# Patient Record
Sex: FEMALE | Race: WHITE | Employment: UNEMPLOYED | URBAN - METROPOLITAN AREA
[De-identification: names, ages, dates, MRNs, and addresses within clinical notes are randomized per-mention and may not be internally consistent; named-entity substitution may affect disease eponyms.]

---

## 2025-06-10 ENCOUNTER — HOSPITAL ENCOUNTER (OUTPATIENT)
Dept: RADIOLOGY | Facility: EXTERNAL LOCATION | Age: 65
Discharge: HOME | End: 2025-06-10

## 2025-06-10 DIAGNOSIS — N64.89 BREAST ASYMMETRY: ICD-10-CM

## 2025-06-12 ENCOUNTER — HOSPITAL ENCOUNTER (OUTPATIENT)
Dept: RADIOLOGY | Facility: EXTERNAL LOCATION | Age: 65
Discharge: HOME | End: 2025-06-12

## 2025-06-12 DIAGNOSIS — N64.89 BREAST ASYMMETRY: ICD-10-CM

## 2025-06-13 ENCOUNTER — TELEPHONE (OUTPATIENT)
Dept: SURGICAL ONCOLOGY | Facility: HOSPITAL | Age: 65
End: 2025-06-13
Payer: COMMERCIAL

## 2025-06-13 NOTE — TELEPHONE ENCOUNTER
Result Communication    Office to call and schedule additional imaging and office visit.     Resulted Orders   BI interpretation of outside films    Narrative    Interpreted By:  Katerina Bettencourt,   STUDY:  BI INTERPRETATION OF OUTSIDE FILMS; BI TRANSFER OF OUTSIDE FILMS;  6/12/2025 11:35 am; 6/12/2025 12:03 pm; 6/12/2025 11:39 am      ACCESSION NUMBER(S):  [id number]; [id number]; [id number]      ORDERING CLINICIAN:  GRAYSON OWEN      INDICATION:  Recommendation for MRI for a left breast focal asymmetry at outside  institution. A second opinion has been requested for bilateral  screening mammogram 05/30/2025, left diagnostic mammogram 06/06/2025  and left targeted ultrasound 06/06/2025. The reports from these  studies are available for review.      This consultation was deemed medically necessary by the referring  health care provider, Grayson Owen.      ,N64.89 Other specified disorders of breast      COMPARISON:  Comparison mammograms dated 05/06/2024 and 05/03/2023 are available.      FINDINGS:  Mammographic Findings: Images are limited due to incomplete inclusion  of posterior tissue on the right MLO view and bilateral CC views.      Density:  The breasts are almost entirely fatty.      There is a stable benign focal asymmetry in the left upper outer  quadrant. This favors focal asymmetric tissue. No suspicious masses  or calcifications are identified.      Sonographic Findings:  Static B-mode images labeled left breast upper outer demonstrate no  sonographic abnormality.        Impression    1. Stable benign left breast focal asymmetry, likely asymmetric  tissue. An MRI is not recommended.  2. Bilateral suboptimal views. Repeats are recommended.  3. Preliminarily, no mammographic evidence of malignancy.      I partially agree with the outside reports.      BI-RADS CATEGORY:  BI-RADS Category:  0 Incomplete; Need Additional Imaging Evaluation  Recommendation:  Additional Imaging.  Recommended Date:   Immediate.  Laterality:  Bilateral.      For any future breast imaging appointments, please call [phone]  [phone]).          MACRO:  None      Signed by: Katerina Bettencourt 6/13/2025 9:49 AM  Dictation workstation:   [id number]       11:22 AM

## 2025-07-11 ENCOUNTER — OFFICE VISIT (OUTPATIENT)
Dept: SURGICAL ONCOLOGY | Facility: HOSPITAL | Age: 65
End: 2025-07-11
Payer: COMMERCIAL

## 2025-07-11 ENCOUNTER — HOSPITAL ENCOUNTER (OUTPATIENT)
Dept: RADIOLOGY | Facility: HOSPITAL | Age: 65
Discharge: HOME | End: 2025-07-11
Payer: COMMERCIAL

## 2025-07-11 VITALS — HEIGHT: 69 IN | WEIGHT: 215 LBS | BODY MASS INDEX: 31.84 KG/M2

## 2025-07-11 VITALS
WEIGHT: 215 LBS | HEART RATE: 102 BPM | HEIGHT: 69 IN | DIASTOLIC BLOOD PRESSURE: 72 MMHG | BODY MASS INDEX: 31.84 KG/M2 | SYSTOLIC BLOOD PRESSURE: 134 MMHG | RESPIRATION RATE: 16 BRPM

## 2025-07-11 DIAGNOSIS — Z80.3 FAMILY HISTORY OF BREAST CANCER: ICD-10-CM

## 2025-07-11 DIAGNOSIS — R92.8 ABNORMAL FINDINGS ON DIAGNOSTIC IMAGING OF BREAST: ICD-10-CM

## 2025-07-11 DIAGNOSIS — R92.8 ABNORMAL FINDING ON BREAST IMAGING: Primary | ICD-10-CM

## 2025-07-11 DIAGNOSIS — R92.8 ABNORMAL MAMMOGRAM: ICD-10-CM

## 2025-07-11 PROCEDURE — 77066 DX MAMMO INCL CAD BI: CPT | Performed by: RADIOLOGY

## 2025-07-11 PROCEDURE — 77062 BREAST TOMOSYNTHESIS BI: CPT | Performed by: RADIOLOGY

## 2025-07-11 PROCEDURE — 3008F BODY MASS INDEX DOCD: CPT | Performed by: NURSE PRACTITIONER

## 2025-07-11 PROCEDURE — 99203 OFFICE O/P NEW LOW 30 MIN: CPT | Performed by: NURSE PRACTITIONER

## 2025-07-11 PROCEDURE — 99213 OFFICE O/P EST LOW 20 MIN: CPT | Performed by: NURSE PRACTITIONER

## 2025-07-11 PROCEDURE — 77062 BREAST TOMOSYNTHESIS BI: CPT

## 2025-07-11 RX ORDER — LISINOPRIL AND HYDROCHLOROTHIAZIDE 10; 12.5 MG/1; MG/1
TABLET ORAL
COMMUNITY
Start: 2025-06-04

## 2025-07-11 RX ORDER — ATORVASTATIN CALCIUM 10 MG/1
TABLET, FILM COATED ORAL
COMMUNITY
Start: 2025-06-04

## 2025-07-14 PROBLEM — Z80.3 FAMILY HISTORY OF BREAST CANCER: Status: ACTIVE | Noted: 2025-07-14

## 2025-07-14 PROBLEM — R92.8 ABNORMAL FINDING ON BREAST IMAGING: Status: ACTIVE | Noted: 2025-07-14

## 2025-07-14 ASSESSMENT — ENCOUNTER SYMPTOMS
PSYCHIATRIC NEGATIVE: 1
ENDOCRINE NEGATIVE: 1
GASTROINTESTINAL NEGATIVE: 1
NEUROLOGICAL NEGATIVE: 1
HEMATOLOGIC/LYMPHATIC NEGATIVE: 1
CONSTITUTIONAL NEGATIVE: 1
CARDIOVASCULAR NEGATIVE: 1
MUSCULOSKELETAL NEGATIVE: 1
RESPIRATORY NEGATIVE: 1
EYES NEGATIVE: 1

## 2025-07-14 NOTE — PROGRESS NOTES
[facility]  Lynn Bates female   1960 65 y.o.   [id number]      Chief Complaint  New patient, abnormal imaging.    History Of Present Illness  Lynn Bates is a pleasant 65 y.o.  female presenting to the breast center for abnormal mammogram. She denies breast surgery or biopsy. She has family history of breast cancer, see below.    REPRODUCTIVE HISTORY: menarche age 14, nulliparous, no OCP's, natural menopause age 57, no HRT, scattered fibroglandular tissue    FAMILY CANCER HISTORY:   Mother: Breast cancer, age 75  Sister: Breast cancer, age 70     Review of Systems  Review of Systems   Constitutional: Negative.    HENT:  Negative.     Eyes: Negative.    Respiratory: Negative.     Cardiovascular: Negative.    Gastrointestinal: Negative.    Endocrine: Negative.    Genitourinary: Negative.     Musculoskeletal: Negative.    Skin: Negative.    Neurological: Negative.    Hematological: Negative.    Psychiatric/Behavioral: Negative.         Surgical History  She has a past surgical history that includes Cholecystectomy (June 2017).    Family History  Cancer-related family history includes Breast cancer (age of onset: 70 - 79) in her mother and sister; Ovarian cancer (age of onset: 60 - 69) in her sister.     Social History  She reports that she has never smoked. She has never used smokeless tobacco. She reports current alcohol use of about 5.0 standard drinks of alcohol per week. She reports current drug use. Frequency: 5.00 times per week. Drug: Marijuana.    Allergies  Patient has no known allergies.    Medications  Current Outpatient Medications   Medication Instructions    atorvastatin (Lipitor) 10 mg tablet     lisinopriL-hydrochlorothiazide 10-12.5 mg tablet        Last Recorded Vitals  Vitals:    07/11/25 1345   BP: 134/72   Pulse: 102   Resp: 16        Physical Exam  Patient is alert and oriented x3 and in a relaxed and appropriate mood. Her gait is steady and hand grasps are  equal. Sclera is clear. The breasts are nearly symmetrical. The tissue is soft without palpable abnormalities, discrete nodules or masses. The skin and nipples appear normal. There is no cervical, supraclavicular or axillary lymphadenopathy.       Relevant Results and Imaging  Study Result    Narrative & Impression   Interpreted By:  Katerina Bettencourt,   STUDY:  BI INTERPRETATION OF OUTSIDE FILMS; BI TRANSFER OF OUTSIDE FILMS;  6/12/2025 11:35 am; 6/12/2025 12:03 pm; 6/12/2025 11:39 am      ACCESSION NUMBER(S):  [id number]; [id number]; [id number]      ORDERING CLINICIAN:  GRAYSON OWEN      INDICATION:  Recommendation for MRI for a left breast focal asymmetry at outside  institution. A second opinion has been requested for bilateral  screening mammogram 05/30/2025, left diagnostic mammogram 06/06/2025  and left targeted ultrasound 06/06/2025. The reports from these  studies are available for review.      This consultation was deemed medically necessary by the referring  health care provider, Grayson Owen.      ,N64.89 Other specified disorders of breast      COMPARISON:  Comparison mammograms dated 05/06/2024 and 05/03/2023 are available.      FINDINGS:  Mammographic Findings: Images are limited due to incomplete inclusion  of posterior tissue on the right MLO view and bilateral CC views.      Density:  The breasts are almost entirely fatty.      There is a stable benign focal asymmetry in the left upper outer  quadrant. This favors focal asymmetric tissue. No suspicious masses  or calcifications are identified.      Sonographic Findings:  Static B-mode images labeled left breast upper outer demonstrate no  sonographic abnormality.      IMPRESSION:  1. Stable benign left breast focal asymmetry, likely asymmetric  tissue. An MRI is not recommended.  2. Bilateral suboptimal views. Repeats are recommended.  3. Preliminarily, no mammographic evidence of malignancy.      I partially agree with the outside  reports.      BI-RADS CATEGORY:  BI-RADS Category:  0 Incomplete; Need Additional Imaging Evaluation  Recommendation:  Additional Imaging.  Recommended Date:  Immediate.  Laterality:  Bilateral.      For any future breast imaging appointments, please call [phone]  ([phone]).          MACRO:  None      Signed by: Katerina Bettencourt 6/13/2025 9:49 AM  Dictation workstation:   [id number]         BI mammo bilateral diagnostic tomosynthesis 07/11/2025    Narrative  Interpreted By:  Katerina Bettencourt,  and Florence Monique  STUDY:  BI MAMMO BILATERAL DIAGNOSTIC TOMOSYNTHESIS;  7/11/2025 2:47 pm    ACCESSION NUMBER(S):  [id number]    ORDERING CLINICIAN:  GRAYSON OWEN    INDICATION:  Patient is called back for repeat imaging from 06/06/2025 due to  technically suboptimal images.    ,R92.8 Other abnormal and inconclusive findings on diagnostic imaging  of breast    COMPARISON:  06/06/2025, 06/30/2025, 05/06/2024, and 05/03/2023.    FINDINGS:  Density:  There are scattered areas of fibroglandular density. Repeat  images were obtained with good positioning.    The focal asymmetry within the superolateral left breast at mid to  posterior depth is stable and benign consistent with normal tissue.  No new suspicious masses or calcifications are identified in either  breast.    Impression  No mammographic evidence of malignancy in either breast.    BI-RADS CATEGORY:  BI-RADS Category:  1 Negative.  Recommendation:  Annual Screening.  Recommended Date:  1 Year.  Laterality:  Bilateral.    For any future breast imaging appointments, please call [phone]  ([phone]).    I personally reviewed the images/study and I agree with the findings  as stated by Kayden Henriquez MD (Radiology Fellow).    MACRO:  None    Signed by: Katerina Bettencourt 7/11/2025 3:06 PM  Dictation workstation:   [id number]      I explained the results in depth, along with suggested explanation for follow up recommendations based on the testing results. BI-RADS  Category 1    Orders  Orders Placed This Encounter   Procedures    Referral to Genetics     Standing Status:   Future     Expected Date:   7/11/2025     Expiration Date:   7/11/2026     Referral Priority:   Routine     Referral Type:   Consultation     Referral Reason:   Consult and Treat     Requested Specialty:   Genetics     Number of Visits Requested:   1       Visit Diagnosis  1. Abnormal finding on breast imaging        2. Family history of breast cancer  Referral to Genetics        Assessment  Normal clinical exam and imaging, family history breast cancer, scattered fibroglandular tissue    Plan/Patient Discussion/Summary  Genetics referral given. Return in 6 months for follow up high risk evaluation and office visit.    You can see your health information, review clinical summaries from office visits & test results online when you follow your health with MY  Chart, a personal health record. To sign up go to [email or web address]. If you need assistance with signing up or trouble getting into your account call [employer] other things you bake Patient Line 24/7 at [phone].    My office phone number is [phone] if you need to get in touch with me or have additional questions or concerns. Thank you for choosing [facility] and trusting me as your healthcare provider. I look forward to seeing you again at your next office visit. I am honored to be a provider on your health care team and I remain dedicated to helping you achieve your health goals.    Eneida Rico, HALLEY-CNP